# Patient Record
Sex: FEMALE | Race: WHITE | NOT HISPANIC OR LATINO | URBAN - METROPOLITAN AREA
[De-identification: names, ages, dates, MRNs, and addresses within clinical notes are randomized per-mention and may not be internally consistent; named-entity substitution may affect disease eponyms.]

---

## 2021-03-09 ENCOUNTER — IMPORTED ENCOUNTER (OUTPATIENT)
Dept: URBAN - METROPOLITAN AREA CLINIC 50 | Facility: CLINIC | Age: 61
End: 2021-03-09

## 2021-04-13 ENCOUNTER — IMPORTED ENCOUNTER (OUTPATIENT)
Dept: URBAN - METROPOLITAN AREA CLINIC 50 | Facility: CLINIC | Age: 61
End: 2021-04-13

## 2021-04-17 ASSESSMENT — VISUAL ACUITY
OS_CC: J2+
OS_PH: 20/40
OD_BAT: 20/40
OD_OTHER: 20/40. 20/40.
OD_CC: 20/40
OD_CC: J2+
OS_OTHER: 20/30. 20/40.
OS_CC: 20/200
OS_BAT: 20/30

## 2021-04-17 ASSESSMENT — TONOMETRY
OS_IOP_MMHG: 14
OD_IOP_MMHG: 14

## 2021-05-24 ENCOUNTER — CONSULTATION (OUTPATIENT)
Dept: URBAN - METROPOLITAN AREA CLINIC 49 | Facility: CLINIC | Age: 61
End: 2021-05-24

## 2021-05-24 DIAGNOSIS — H04.123: ICD-10-CM

## 2021-05-24 DIAGNOSIS — H02.831: ICD-10-CM

## 2021-05-24 DIAGNOSIS — H02.834: ICD-10-CM

## 2021-05-24 PROCEDURE — 92012 INTRM OPH EXAM EST PATIENT: CPT

## 2021-05-24 ASSESSMENT — TONOMETRY
OS_IOP_MMHG: 14
OD_IOP_MMHG: 14

## 2021-05-24 ASSESSMENT — VISUAL ACUITY
OS_CC: 20/70
OS_PH: 20/50-1
OD_CC: 20/30-2

## 2021-09-20 ENCOUNTER — PRE OP - BLEPHAROPLASTY (OUTPATIENT)
Dept: URBAN - METROPOLITAN AREA CLINIC 49 | Facility: CLINIC | Age: 61
End: 2021-09-20

## 2021-09-20 VITALS — HEIGHT: 55 IN | SYSTOLIC BLOOD PRESSURE: 135 MMHG | DIASTOLIC BLOOD PRESSURE: 86 MMHG

## 2021-09-20 DIAGNOSIS — H02.834: ICD-10-CM

## 2021-09-20 DIAGNOSIS — H02.831: ICD-10-CM

## 2021-09-20 PROCEDURE — PREOP PRE OP VISIT

## 2021-09-20 ASSESSMENT — VISUAL ACUITY
OU_CC: J1
OD_CC: 20/25
OU_CC: 20/20-2
OS_CC: 20/70

## 2021-09-28 ENCOUNTER — SURGERY/PROCEDURE (OUTPATIENT)
Dept: URBAN - METROPOLITAN AREA SURGERY 16 | Facility: SURGERY | Age: 61
End: 2021-09-28

## 2021-09-28 DIAGNOSIS — H02.834: ICD-10-CM

## 2021-09-28 DIAGNOSIS — H02.831: ICD-10-CM

## 2021-09-28 PROCEDURE — 15823 BLEPHARP UPR EYELID XCSV SKN: CPT

## 2021-09-28 RX ORDER — ERYTHROMYCIN 5 MG/G
OINTMENT OPHTHALMIC
Start: 2021-09-28

## 2021-10-06 ENCOUNTER — 1 WEEK PO - BLEPHAROPLASTY (OUTPATIENT)
Dept: URBAN - METROPOLITAN AREA CLINIC 49 | Facility: CLINIC | Age: 61
End: 2021-10-06

## 2021-10-06 DIAGNOSIS — Z98.890: ICD-10-CM

## 2021-10-06 PROCEDURE — 99024 POSTOP FOLLOW-UP VISIT: CPT

## 2021-10-06 ASSESSMENT — VISUAL ACUITY
OD_PH: 20/25-2
OS_CC: 20/30-1
OD_CC: 20/30-2

## 2024-03-12 ENCOUNTER — APPOINTMENT (RX ONLY)
Dept: URBAN - METROPOLITAN AREA CLINIC 58 | Facility: CLINIC | Age: 64
Setting detail: DERMATOLOGY
End: 2024-03-12

## 2024-03-12 DIAGNOSIS — Z41.9 ENCOUNTER FOR PROCEDURE FOR PURPOSES OTHER THAN REMEDYING HEALTH STATE, UNSPECIFIED: ICD-10-CM

## 2024-03-12 PROCEDURE — ? COSMETIC CONSULTATION: BOTOX

## 2024-03-12 PROCEDURE — ? BOTOX (U OR CC)

## 2024-03-12 ASSESSMENT — LOCATION DETAILED DESCRIPTION DERM
LOCATION DETAILED: LEFT INFERIOR MEDIAL FOREHEAD
LOCATION DETAILED: GLABELLA
LOCATION DETAILED: RIGHT INFERIOR MEDIAL FOREHEAD
LOCATION DETAILED: LEFT INFERIOR FOREHEAD
LOCATION DETAILED: RIGHT FOREHEAD
LOCATION DETAILED: LEFT MEDIAL FOREHEAD
LOCATION DETAILED: RIGHT MEDIAL FOREHEAD
LOCATION DETAILED: RIGHT INFERIOR FOREHEAD
LOCATION DETAILED: LEFT LATERAL FOREHEAD
LOCATION DETAILED: INFERIOR MID FOREHEAD
LOCATION DETAILED: LEFT FOREHEAD

## 2024-03-12 ASSESSMENT — LOCATION SIMPLE DESCRIPTION DERM
LOCATION SIMPLE: LEFT FOREHEAD
LOCATION SIMPLE: GLABELLA
LOCATION SIMPLE: INFERIOR FOREHEAD
LOCATION SIMPLE: RIGHT FOREHEAD

## 2024-03-12 ASSESSMENT — LOCATION ZONE DERM: LOCATION ZONE: FACE

## 2024-03-12 NOTE — PROCEDURE: BOTOX (U OR CC)
Inferior Lateral Orbicularis Oculi Units: 0
Document As Units Or Cc?: units
Consent: Written consent obtained. Risks include but not limited to lid/brow ptosis, bruising, swelling, diplopia, temporary effect, incomplete chemical denervation.
Expiration Date (Month Year): 11/2025
Dilution (U/0.1 Cc): 1
Including Pricing Information In The Note: No
Reconstitution Date (Optional): 03/2024
Lot #: [id number]
Post-Care Instructions: Patient instructed to not lie down for 4 hours and limit physical activity for 24 hours.
Price (Use Numbers Only, No Special Characters Or $): 838
Detail Level: Detailed
Forehead Units/Cc: 20
Glabellar Complex Units: 23
Quantity Per Injection Site (Units Or Cc): 5 U
Quantity Per Injection Site (Units Or Cc): 4 U
Quantity Per Injection Site (Units Or Cc): 2 U

## 2024-04-15 ENCOUNTER — APPOINTMENT (RX ONLY)
Dept: URBAN - METROPOLITAN AREA CLINIC 58 | Facility: CLINIC | Age: 64
Setting detail: DERMATOLOGY
End: 2024-04-15

## 2024-04-15 DIAGNOSIS — Z41.9 ENCOUNTER FOR PROCEDURE FOR PURPOSES OTHER THAN REMEDYING HEALTH STATE, UNSPECIFIED: ICD-10-CM

## 2024-04-15 PROCEDURE — ? COSMETIC CONSULTATION: BOTOX

## 2024-04-15 PROCEDURE — ? COSMETIC FOLLOW-UP

## 2024-04-15 PROCEDURE — ? BOTOX (U OR CC)

## 2024-04-15 ASSESSMENT — LOCATION DETAILED DESCRIPTION DERM: LOCATION DETAILED: GLABELLA

## 2024-04-15 ASSESSMENT — LOCATION SIMPLE DESCRIPTION DERM: LOCATION SIMPLE: GLABELLA

## 2024-04-15 ASSESSMENT — LOCATION ZONE DERM: LOCATION ZONE: FACE

## 2024-04-15 NOTE — PROCEDURE: COSMETIC FOLLOW-UP
Treatment (Optional): Botox
Patient Satisfaction: Pleased
Side Effects Or Complications: None
Detail Level: Zone
Global Improvement: Good

## 2024-04-15 NOTE — PROCEDURE: BOTOX (U OR CC)
Additional Area 4 Units: 0
Glabellar Complex Units: 6
Reconstitution Date (Optional): 04/09/2024
Price (Use Numbers Only, No Special Characters Or $): 84
Lot #: [id number]
Document As Units Or Cc?: units
Including Pricing Information In The Note: No
Detail Level: Detailed
Consent: Written consent obtained. Risks include but not limited to lid/brow ptosis, bruising, swelling, diplopia, temporary effect, incomplete chemical denervation.
Dilution (U/0.1 Cc): 1
Expiration Date (Month Year): 03/2026
Post-Care Instructions: Patient instructed to not lie down for 4 hours and limit physical activity for 24 hours.
Quantity Per Injection Site (Units Or Cc): 2 U

## 2024-11-14 ENCOUNTER — APPOINTMENT (RX ONLY)
Dept: URBAN - METROPOLITAN AREA CLINIC 58 | Facility: CLINIC | Age: 64
Setting detail: DERMATOLOGY
End: 2024-11-14

## 2024-11-14 DIAGNOSIS — Z41.9 ENCOUNTER FOR PROCEDURE FOR PURPOSES OTHER THAN REMEDYING HEALTH STATE, UNSPECIFIED: ICD-10-CM

## 2024-11-14 PROCEDURE — ? COSMETIC CONSULTATION: BOTOX

## 2024-11-14 PROCEDURE — ? BOTOX (U OR CC)

## 2024-11-14 ASSESSMENT — LOCATION ZONE DERM: LOCATION ZONE: FACE

## 2024-11-14 ASSESSMENT — LOCATION DETAILED DESCRIPTION DERM
LOCATION DETAILED: LEFT INFERIOR MEDIAL FOREHEAD
LOCATION DETAILED: RIGHT INFERIOR MEDIAL FOREHEAD
LOCATION DETAILED: LEFT LATERAL FOREHEAD
LOCATION DETAILED: LEFT FOREHEAD
LOCATION DETAILED: INFERIOR MID FOREHEAD
LOCATION DETAILED: GLABELLA
LOCATION DETAILED: RIGHT MEDIAL FOREHEAD
LOCATION DETAILED: LEFT MEDIAL FOREHEAD
LOCATION DETAILED: RIGHT FOREHEAD
LOCATION DETAILED: RIGHT INFERIOR FOREHEAD
LOCATION DETAILED: LEFT INFERIOR FOREHEAD

## 2024-11-14 NOTE — PROCEDURE: BOTOX (U OR CC)
Inferior Lateral Orbicularis Oculi Units: 0
Document As Units Or Cc?: units
Consent: Written consent obtained. Risks include but not limited to lid/brow ptosis, bruising, swelling, diplopia, temporary effect, incomplete chemical denervation.
Expiration Date (Month Year): 09/2026 ~ 11/2026
Dilution (U/0.1 Cc): 1
Including Pricing Information In The Note: No
Reconstitution Date (Optional): 10/24/2024 ~ 11/14/2024
Lot #: [id number]
Post-Care Instructions: Patient instructed to not lie down for 4 hours and limit physical activity for 24 hours.
Price (Use Numbers Only, No Special Characters Or $): 067
Detail Level: Detailed
Forehead Units/Cc: 20
Glabellar Complex Units: 29
Quantity Per Injection Site (Units Or Cc): 7 U
Quantity Per Injection Site (Units Or Cc): 4 U
Quantity Per Injection Site (Units Or Cc): 2 U

## 2025-07-17 ENCOUNTER — PREPPED CHART (OUTPATIENT)
Age: 65
End: 2025-07-17

## 2025-07-17 ENCOUNTER — NEW PATIENT (OUTPATIENT)
Age: 65
End: 2025-07-17

## 2025-07-17 DIAGNOSIS — H04.123: ICD-10-CM

## 2025-07-17 DIAGNOSIS — H52.4: ICD-10-CM

## 2025-07-17 DIAGNOSIS — Z01.01: ICD-10-CM

## 2025-07-17 DIAGNOSIS — H25.13: ICD-10-CM

## 2025-07-17 DIAGNOSIS — Z97.3: ICD-10-CM

## 2025-07-17 PROCEDURE — 92310-1 LEVEL 1 SOFT LENS UPDATE

## 2025-07-17 PROCEDURE — 92015 DETERMINE REFRACTIVE STATE: CPT

## 2025-07-17 PROCEDURE — 92004 COMPRE OPH EXAM NEW PT 1/>: CPT
